# Patient Record
Sex: MALE | Race: WHITE | URBAN - METROPOLITAN AREA
[De-identification: names, ages, dates, MRNs, and addresses within clinical notes are randomized per-mention and may not be internally consistent; named-entity substitution may affect disease eponyms.]

---

## 2017-07-11 ENCOUNTER — OFFICE VISIT (OUTPATIENT)
Dept: OPHTHALMOLOGY | Facility: CLINIC | Age: 82
End: 2017-07-11
Payer: COMMERCIAL

## 2017-07-11 DIAGNOSIS — H16.9 KERATITIS: Primary | ICD-10-CM

## 2017-07-11 PROCEDURE — 92002 INTRM OPH EXAM NEW PATIENT: CPT | Performed by: OPHTHALMOLOGY

## 2017-07-11 RX ORDER — NEOMYCIN SULFATE, POLYMYXIN B SULFATE AND DEXAMETHASONE 3.5; 10000; 1 MG/ML; [USP'U]/ML; MG/ML
1 SUSPENSION/ DROPS OPHTHALMIC 4 TIMES DAILY
Qty: 1 BOTTLE | Refills: 3 | Status: SHIPPED | OUTPATIENT
Start: 2017-07-11

## 2017-07-11 ASSESSMENT — VISUAL ACUITY
CORRECTION_TYPE: GLASSES
OD_CC: CF @ 4'
OS_CC+: -2
METHOD: SNELLEN - LINEAR
OS_CC: 20/40

## 2017-07-11 NOTE — PROGRESS NOTES
Current Eye Medications:  None.  He was given a pill at the ER last night and was suppose to take another one this morning, but hasn't.      Subjective:  For the past 3 weeks, he has been seeing spider-like objects floating in his vision, right eye.  Vision is blurred, also for 3 weeks.  Right eye is red.  Patient was seen in [facility] ER last night and was told to see Dr. Maxwell today.     Status/Post cataract surgery, about 3 years ago, both eyes.  Vision was good at that time.     Had injections right eye with ER 5-6 yrs ago.  Denies hx of herpes simplex virus or Herpes zoster ophthalmicus.       Objective:  See Ophthalmology Exam.       Assessment:  Iritis and corneal edema right eye of indeterminate etiology.      Plan: Continue Acyclovir pills as prescribed.  Start Hector/Dex one drop right eye four times daily.  Start Homatropine one drop right eye three times daily.  Return visit one week for an intraocular pressure check and dilation.  Hussain Maxwell M.D.

## 2017-07-11 NOTE — PATIENT INSTRUCTIONS
Continue Acyclovir pills as prescribed.  Start Hector/Dex one drop right eye four times daily.  Start Homatropine one drop right eye three times daily.  Return visit one week for an intraocular pressure check and dilation.  Hussain Maxwell M.D.

## 2017-07-11 NOTE — MR AVS SNAPSHOT
"              After Visit Summary   7/11/2017    Luis Antonio Dong    MRN: [id number]           Patient Information     Date Of Birth          1935        Visit Information        Provider Department      7/11/2017 12:45 PM Hussain Maxwell MD [facility]        Today's Diagnoses     Keratitis    -  1      Care Instructions    Continue Acyclovir pills as prescribed.  Start Hector/Dex one drop right eye four times daily.  Start Homatropine one drop right eye three times daily.  Return visit one week for an intraocular pressure check and dilation.  Hussain Maxwell M.D.            Follow-ups after your visit        Who to contact     If you have questions or need follow up information about today's clinic visit or your schedule please contact [facility] directly at [phone].  Normal or non-critical lab and imaging results will be communicated to you by MyChart, letter or phone within 4 business days after the clinic has received the results. If you do not hear from us within 7 days, please contact the clinic through MyChart or phone. If you have a critical or abnormal lab result, we will notify you by phone as soon as possible.  Submit refill requests through [employer] or call your pharmacy and they will forward the refill request to us. Please allow 3 business days for your refill to be completed.          Additional Information About Your Visit        MyChart Information     [employer] lets you send messages to your doctor, view your test results, renew your prescriptions, schedule appointments and more. To sign up, go to [email or web address] . Click on \"Log in\" on the left side of the screen, which will take you to the Welcome page. Then click on \"Sign up Now\" on the right side of the page.     You will be asked to enter the access code listed below, as well as some personal information. Please follow the directions to create your username and password.     Your access code is: " [email or web address]  Expires: 10/9/2017  2:31 PM     Your access code will  in 90 days. If you need help or a new code, please call your [place] clinic or [phone].        Care EveryWhere ID     This is your Care EveryWhere ID. This could be used by other organizations to access your [place] medical records  [id number]         Blood Pressure from Last 3 Encounters:   No data found for BP    Weight from Last 3 Encounters:   No data found for Wt              Today, you had the following     No orders found for display         Today's Medication Changes          These changes are accurate as of: 17  2:31 PM.  If you have any questions, ask your nurse or doctor.               Start taking these medicines.        Dose/Directions    homatropine 5 % ophthalmic solution   Used for:  Keratitis        Dose:  1 drop   Place 1 drop into the right eye 3 times daily   Quantity:  1 mL   Refills:  3       neomycin-polymyxin-dexamethasone 3.5-85981-2.1 Susp ophthalmic susp   Commonly known as:  MAXITROL   Used for:  Keratitis        Dose:  1 drop   Place 1 drop into the right eye 4 times daily   Quantity:  1 Bottle   Refills:  3            Where to get your medicines      These medications were sent to [facility]/pharmacy #[id number] - [place], [place] - [address] [place] [address]     Phone:  [phone]     homatropine 5 % ophthalmic solution    neomycin-polymyxin-dexamethasone 3.5-33115-8.1 Susp ophthalmic susp                Primary Care Provider    None Specified       No primary provider on file.        Equal Access to Services     [facility] : Hadii aad ku hadasho Sokeikoali, waaxda luqadaha, qaybta kaalmada adeegyada, waxbirgit lydia farooq adeanthony hannah . So [facility] [phone].    ATENCIÓN: Si habla español, tiene a gao disposición servicios gratuitos de asistencia lingüística. Llame al [phone].    We comply with applicable federal civil rights laws and  [place] laws. We do not discriminate on the basis of race, color, national origin, age, disability sex, sexual orientation or gender identity.            Thank you!     Thank you for choosing [facility]  for your care. Our goal is always to provide you with excellent care. Hearing back from our patients is one way we can continue to improve our services. Please take a few minutes to complete the written survey that you may receive in the mail after your visit with us. Thank you!             Your Updated Medication List - Protect others around you: Learn how to safely use, store and throw away your medicines at www.disposemymeds.org.          This list is accurate as of: 7/11/17  2:31 PM.  Always use your most recent med list.                   Brand Name Dispense Instructions for use Diagnosis    homatropine 5 % ophthalmic solution     1 mL    Place 1 drop into the right eye 3 times daily    Keratitis       neomycin-polymyxin-dexamethasone 3.5-53223-5.1 Susp ophthalmic susp    MAXITROL    1 Bottle    Place 1 drop into the right eye 4 times daily    Keratitis       UNKNOWN TO PATIENT

## 2017-07-21 PROBLEM — H16.9 KERATITIS: Status: ACTIVE | Noted: 2017-07-21

## 2017-07-21 ASSESSMENT — SLIT LAMP EXAM - LIDS
COMMENTS: NORMAL
COMMENTS: NORMAL

## 2017-07-21 ASSESSMENT — EXTERNAL EXAM - LEFT EYE: OS_EXAM: NORMAL

## 2017-07-21 ASSESSMENT — EXTERNAL EXAM - RIGHT EYE: OD_EXAM: NORMAL

## 2017-07-21 ASSESSMENT — TONOMETRY
IOP_METHOD: APPLANATION
OD_IOP_MMHG: 10

## 2021-06-01 ENCOUNTER — RECORDS - HEALTHEAST (OUTPATIENT)
Dept: ADMINISTRATIVE | Facility: CLINIC | Age: 86
End: 2021-06-01

## 2021-06-02 ENCOUNTER — RECORDS - HEALTHEAST (OUTPATIENT)
Dept: ADMINISTRATIVE | Facility: CLINIC | Age: 86
End: 2021-06-02